# Patient Record
Sex: FEMALE | Race: WHITE
[De-identification: names, ages, dates, MRNs, and addresses within clinical notes are randomized per-mention and may not be internally consistent; named-entity substitution may affect disease eponyms.]

---

## 2017-08-22 ENCOUNTER — HOSPITAL ENCOUNTER (OUTPATIENT)
Dept: HOSPITAL 14 - H.ENDO | Age: 57
Discharge: HOME | End: 2017-08-22
Attending: INTERNAL MEDICINE
Payer: SELF-PAY

## 2017-08-22 VITALS — HEART RATE: 65 BPM | RESPIRATION RATE: 12 BRPM

## 2017-08-22 VITALS — DIASTOLIC BLOOD PRESSURE: 67 MMHG | TEMPERATURE: 97.5 F | SYSTOLIC BLOOD PRESSURE: 108 MMHG

## 2017-08-22 VITALS — OXYGEN SATURATION: 100 %

## 2017-08-22 DIAGNOSIS — K64.1: ICD-10-CM

## 2017-08-22 DIAGNOSIS — Z09: ICD-10-CM

## 2017-08-22 DIAGNOSIS — E03.9: ICD-10-CM

## 2017-08-22 DIAGNOSIS — K57.92: Primary | ICD-10-CM

## 2017-08-22 PROCEDURE — 45378 DIAGNOSTIC COLONOSCOPY: CPT

## 2017-09-25 ENCOUNTER — HOSPITAL ENCOUNTER (INPATIENT)
Dept: HOSPITAL 31 - C.9S | Age: 57
LOS: 4 days | Discharge: HOME | DRG: 149 | End: 2017-09-29
Attending: SURGERY | Admitting: SURGERY
Payer: COMMERCIAL

## 2017-09-25 VITALS — BODY MASS INDEX: 31.5 KG/M2

## 2017-09-25 DIAGNOSIS — S36.439A: ICD-10-CM

## 2017-09-25 DIAGNOSIS — K43.5: Primary | ICD-10-CM
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
## 2017-09-25 DIAGNOSIS — K57.30: ICD-10-CM

## 2017-09-25 DIAGNOSIS — K66.0: ICD-10-CM

## 2017-09-25 PROCEDURE — 0DNE0ZZ RELEASE LARGE INTESTINE, OPEN APPROACH: ICD-10-PCS | Performed by: SURGERY

## 2017-09-25 PROCEDURE — 0HB7XZZ EXCISION OF ABDOMEN SKIN, EXTERNAL APPROACH: ICD-10-PCS | Performed by: SURGERY

## 2017-09-25 PROCEDURE — 0WHF03Z INSERTION OF INFUSION DEVICE INTO ABDOMINAL WALL, OPEN APPROACH: ICD-10-PCS | Performed by: SURGERY

## 2017-09-25 PROCEDURE — 0DTJ0ZZ RESECTION OF APPENDIX, OPEN APPROACH: ICD-10-PCS | Performed by: SURGERY

## 2017-09-25 PROCEDURE — 0WQF0ZZ REPAIR ABDOMINAL WALL, OPEN APPROACH: ICD-10-PCS | Performed by: SURGERY

## 2017-09-25 PROCEDURE — 0DQE0ZZ REPAIR LARGE INTESTINE, OPEN APPROACH: ICD-10-PCS | Performed by: SURGERY

## 2017-09-25 RX ADMIN — HYDROMORPHONE HYDROCHLORIDE PRN MG: 1 INJECTION, SOLUTION INTRAMUSCULAR; INTRAVENOUS; SUBCUTANEOUS at 14:24

## 2017-09-25 RX ADMIN — HYDROMORPHONE HYDROCHLORIDE PRN MG: 1 INJECTION, SOLUTION INTRAMUSCULAR; INTRAVENOUS; SUBCUTANEOUS at 14:12

## 2017-09-25 RX ADMIN — HYDROMORPHONE HYDROCHLORIDE PRN MG: 1 INJECTION, SOLUTION INTRAMUSCULAR; INTRAVENOUS; SUBCUTANEOUS at 16:06

## 2017-09-25 RX ADMIN — HYDROMORPHONE HYDROCHLORIDE PRN MG: 1 INJECTION, SOLUTION INTRAMUSCULAR; INTRAVENOUS; SUBCUTANEOUS at 14:36

## 2017-09-25 RX ADMIN — TAZOBACTAM SODIUM AND PIPERACILLIN SODIUM SCH MLS/HR: 375; 3 INJECTION, SOLUTION INTRAVENOUS at 23:52

## 2017-09-25 NOTE — PCM.SURG1
Surgeon's Initial Post Op Note





- Surgeon's Notes


Surgeon: Dr German


Assistant: Kori Jefferson PGY3


Type of Anesthesia: General Endo


Pre-Operative Diagnosis: diverticulosis


Operative Findings: see report


Post-Operative Diagnosis: as above


Operation Performed: Colostomy reversal.  lysis of adhesions.  incidental 

appendectomy.  On-Q placement


Specimen/Specimens Removed: ostomy.  colon.  hernia sac.  appendix


Estimated Blood Loss: EBL {In ML}: 100


Blood Products Given: N/A


Drains Used: Jatin


Post-Op Condition: Good


Date of Surgery/Procedure: 09/25/17


Time of Surgery/Procedure: 13:57

## 2017-09-26 LAB
ALBUMIN/GLOB SERPL: 1.2 {RATIO} (ref 1–2.1)
ALP SERPL-CCNC: 36 U/L (ref 38–126)
ALT SERPL-CCNC: 27 U/L (ref 9–52)
AST SERPL-CCNC: 29 U/L (ref 14–36)
BASOPHILS # BLD AUTO: 0.1 K/UL (ref 0–0.2)
BASOPHILS NFR BLD: 0.7 % (ref 0–2)
BILIRUB SERPL-MCNC: 0.5 MG/DL (ref 0.2–1.3)
BUN SERPL-MCNC: 6 MG/DL (ref 7–17)
CALCIUM SERPL-MCNC: 7.9 MG/DL (ref 8.6–10.4)
CHLORIDE SERPL-SCNC: 105 MMOL/L (ref 98–107)
CO2 SERPL-SCNC: 23 MMOL/L (ref 22–30)
EOSINOPHIL # BLD AUTO: 0 K/UL (ref 0–0.7)
EOSINOPHIL NFR BLD: 0.2 % (ref 0–4)
EOSINOPHIL NFR BLD: 1 % (ref 0–4)
ERYTHROCYTE [DISTWIDTH] IN BLOOD BY AUTOMATED COUNT: 14.4 % (ref 11.5–14.5)
GLOBULIN SER-MCNC: 2.4 GM/DL (ref 2.2–3.9)
GLUCOSE SERPL-MCNC: 109 MG/DL (ref 65–105)
HCT VFR BLD CALC: 33.3 % (ref 34–47)
LYMPHOCYTES # BLD AUTO: 1.7 K/UL (ref 1–4.3)
LYMPHOCYTES NFR BLD AUTO: 9 % (ref 20–40)
MCH RBC QN AUTO: 28.6 PG (ref 27–31)
MCHC RBC AUTO-ENTMCNC: 33.7 G/DL (ref 33–37)
MCV RBC AUTO: 84.9 FL (ref 81–99)
MONOCYTES # BLD: 1.9 K/UL (ref 0–0.8)
MONOCYTES NFR BLD: 10.4 % (ref 0–10)
NEUTROPHILS NFR BLD AUTO: 76 % (ref 50–75)
NRBC BLD AUTO-RTO: 0 % (ref 0–2)
PLATELET # BLD: 314 K/UL (ref 130–400)
PMV BLD AUTO: 9.6 FL (ref 7.2–11.7)
POTASSIUM SERPL-SCNC: 3.6 MMOL/L (ref 3.6–5.2)
PROT SERPL-MCNC: 5.2 G/DL (ref 6.3–8.3)
SODIUM SERPL-SCNC: 138 MMOL/L (ref 132–148)
TOTAL CELLS COUNTED BLD: 100
WBC # BLD AUTO: 18.5 K/UL (ref 4.8–10.8)

## 2017-09-26 RX ADMIN — TAZOBACTAM SODIUM AND PIPERACILLIN SODIUM SCH MLS/HR: 375; 3 INJECTION, SOLUTION INTRAVENOUS at 16:03

## 2017-09-26 RX ADMIN — TAZOBACTAM SODIUM AND PIPERACILLIN SODIUM SCH MLS/HR: 375; 3 INJECTION, SOLUTION INTRAVENOUS at 12:01

## 2017-09-26 RX ADMIN — ENOXAPARIN SODIUM SCH MG: 30 INJECTION SUBCUTANEOUS at 10:11

## 2017-09-26 RX ADMIN — TAZOBACTAM SODIUM AND PIPERACILLIN SODIUM SCH MLS/HR: 375; 3 INJECTION, SOLUTION INTRAVENOUS at 22:01

## 2017-09-26 RX ADMIN — TAZOBACTAM SODIUM AND PIPERACILLIN SODIUM SCH MLS/HR: 375; 3 INJECTION, SOLUTION INTRAVENOUS at 05:28

## 2017-09-26 RX ADMIN — WATER SCH MLS/HR: 1 INJECTION INTRAMUSCULAR; INTRAVENOUS; SUBCUTANEOUS at 10:12

## 2017-09-26 RX ADMIN — WATER SCH MLS/HR: 1 INJECTION INTRAMUSCULAR; INTRAVENOUS; SUBCUTANEOUS at 02:08

## 2017-09-26 NOTE — CP.PCM.PN
<KoriJoseph CONOR - Last Filed: 09/26/17 09:05>





Subjective





- Date & Time of Evaluation


Date of Evaluation: 09/26/17


Time of Evaluation: 09:03





- Subjective


Subjective: 





Gen Sx:  Dr German





Pt S&E.  POD#1 s/p colostomy reversal.  Pt is doing very well.  Denies pain at 

this time.  Denies N/V, F/C.  Not yet passing flatus.  Making adequate urine 

output.  Using incentive spirometer.  OnQ in place and functioning.





NGT - 20cc


Jatin - 50cc





Objective





- Vital Signs/Intake and Output


Vital Signs (last 24 hours): 


 











Temp Pulse Resp BP Pulse Ox


 


 99.5 F   103 H  20   121/76   96 


 


 09/26/17 07:00  09/26/17 07:00  09/26/17 07:00  09/26/17 07:00  09/26/17 07:00








Intake and Output: 


 











 09/26/17 09/26/17





 06:59 18:59


 


Intake Total 1500 


 


Output Total 820 


 


Balance 680 














- Medications


Medications: 


 Current Medications





Enoxaparin Sodium (Lovenox)  30 mg SC DAILY [facility]


Hydromorphone/Sodium Chloride (Dilaudid Pca)  4.8 mg IV Q4H PRN; Protocol


   PRN Reason: Pain, moderate (4-7)


   Last Admin: 09/25/17 15:30 Dose:  4.8 mg


BUPIVACAINE 0.125%/0.9% NACL (Bupivacaine-Ns 0.125% On-Q )  600 mls @ 4 mls/

hr IJ ONCE ONE


   Stop: 10/01/17 14:59


Sodium Chloride (Sodium Chloride 0.9%)  1,000 mls @ 125 mls/hr IV .Q8H [facility]


   Last Admin: 09/26/17 06:00 Dose:  Not Given


Metronidazole (Flagyl)  500 mg in 100 mls @ 100 mls/hr IVPB Q8H [facility]


   Stop: 09/26/17 10:59


   Last Admin: 09/26/17 02:08 Dose:  100 mls/hr


Influenza Virus Vaccine (Afluria)  45 mcg IM .ONCE ONE


   Stop: 09/26/17 19:53


Ketorolac Tromethamine (Toradol)  30 mg IV Q6 PRN


   PRN Reason: Pain, moderate (4-7)


Levothyroxine Sodium (Synthroid)  100 mcg PO DAILY@0630 [facility]


   Last Admin: 09/26/17 06:01 Dose:  Not Given


Ondansetron HCl (Zofran Inj)  4 mg IVP Q6 PRN


   PRN Reason: Nausea/Vomiting


   Last Admin: 09/26/17 04:30 Dose:  4 mg











- Labs


Labs: 


 





 09/26/17 07:09 





 09/26/17 07:09 











- Constitutional


Appears: Non-toxic, No Acute Distress





- ENT Exam


ENT Exam: Mucous Membranes Moist





- Respiratory Exam


Respiratory Exam: absent: Accessory Muscle Use, Respiratory Distress





- Cardiovascular Exam


Cardiovascular Exam: REGULAR RHYTHM





- GI/Abdominal Exam


GI & Abdominal Exam: Soft, Tenderness (post-op and appropriate).  absent: 

Distended, Firm


Additional comments: 





dressing c/d/i





- Neurological Exam


Neurological Exam: Alert, Awake, Oriented x3





- Psychiatric Exam


Psychiatric exam: Normal Affect, Normal Mood





- Skin


Skin Exam: Normal Color, Warm





Assessment and Plan





- Assessment and Plan (Free Text)


Assessment: 





56F POD#1 s/p colostomy reversal


Plan: 





d/c cardenas


cont IV fluids


cont NGT to LIS


cont current pain regimen


OOB and ambulate - will order PT


will d/w Dr German continuation of abx





Joseph Sheikh, PGY3





<Byron German - Last Filed: 09/27/17 11:59>





Objective





- Vital Signs/Intake and Output


Vital Signs (last 24 hours): 


 











Temp Pulse Resp BP Pulse Ox


 


 98.7 F   98 H  18   135/85   94 L


 


 09/27/17 07:05  09/27/17 07:05  09/27/17 07:05  09/27/17 07:05  09/27/17 07:05








Intake and Output: 


 











 09/27/17 09/27/17





 06:59 18:59


 


Intake Total 2000 


 


Output Total 490 


 


Balance 1510 














- Medications


Medications: 


 Current Medications





Acetaminophen (Tylenol 325mg Tab)  975 mg PO Q6 PRN


   PRN Reason: Fever >100.4 F


Enoxaparin Sodium (Lovenox)  30 mg SC DAILY [facility]


   Last Admin: 09/26/17 10:11 Dose:  30 mg


Hydromorphone HCl (Dilaudid)  1 mg IVP Q3 PRN


   PRN Reason: Pain, moderate (4-7)


Hydromorphone HCl (Dilaudid)  0.5 mg IVP Q4H PRN


   PRN Reason: breakthrough pain


Sodium Chloride (Sodium Chloride 0.9%)  1,000 mls @ 125 mls/hr IV .Q8H [facility]


   Last Admin: 09/27/17 06:13 Dose:  125 mls/hr


Piperacillin Sod/Tazobactam Sod (Zosyn 3.375 Gm Iv Premix)  3.375 gm in 50 mls 

@ 100 mls/hr IVPB Q6H [facility]


   Last Admin: 09/27/17 11:13 Dose:  100 mls/hr


BUPIVACAINE 0.125%/0.9% NACL (Bupivacaine-Ns 0.125% On-Q )  600 mls @ 4 mls/

hr IJ ONCE ONE


   Stop: 10/03/17 17:59


Ketorolac Tromethamine (Toradol)  30 mg IV Q6 PRN


   PRN Reason: Pain, moderate (4-7)


   Last Admin: 09/26/17 13:55 Dose:  30 mg


Levothyroxine Sodium (Synthroid)  100 mcg PO DAILY@0630 [facility]


   Last Admin: 09/27/17 06:13 Dose:  Not Given


Ondansetron HCl (Zofran Inj)  4 mg IVP Q6 PRN


   PRN Reason: Nausea/Vomiting


   Last Admin: 09/26/17 12:07 Dose:  4 mg


Pantoprazole Sodium (Protonix Inj)  40 mg IVP DAILY [facility]











- Labs


Labs: 


 





 09/27/17 07:07 





 09/27/17 07:07 











Attending/Attestation





- Attestation


I have personally seen and examined this patient.: Yes


I have fully participated in the care of the patient.: Yes


I have reviewed all pertinent clinical information, including history, physical 

exam and plan: Yes


Notes (Text): 





09/27/17 11:58


Pt was seen and examined at bedside


Agree with above note and assessment


Pt is s/p colostomy reversal and partial rectal resection


DC cardenas, NG


DVT prophylaxis


C/w IV antibiotics for leucocytosis


repeat Labs in am


Plan d.w pt in detail

## 2017-09-26 NOTE — OP
PROCEDURE DATE:  09/25/2017



PREOPERATIVE DIAGNOSES:

1.  Colostomy status.

2.  Diverticulosis.

3.  Possible extensive postoperative adhesion due to previous exploratory

laparotomy.

4.  Colostomy site hernia.



POSTOPERATIVE DIAGNOSES:

1.  Colostomy status.

2.  Diverticulosis.

3.  Possible extensive postoperative adhesion due to previous exploratory

laparotomy.

4.  Colostomy site hernia.

5.  Thickened and long appendix.



PROCEDURES DONE

1.  Exploratory laparotomy.

2.  Extensive lysis of adhesions.

3.  Partial rectal resection.

4.  Colorectal anastomosis.

5.  Appendectomy.

6.  Repair of serosal tear of small bowel.

7.  On-Q pain catheter pump placement.

8.  Colostomy site hernia repair, primary closure, double layer.

9.  Revision of the old scar approximately 18 x 2 cm size.



SURGEON:  Dr. German.



ASSISTANT:  Joseph Forbes, PGY 3 resident and LILLY Rodas.



TYPE OF ANESTHESIA:  General endotracheal tube anesthesia.



ESTIMATED BLOOD LOSS:  Around 100 mL.



DRAIN:  A 19-Turkmen Jatin drain was placed.



COMPLICATIONS:  None.



INTRAOPERATIVE FINDINGS:  The patient had extensive postoperative adhesions

of the small bowel to anterior abdominal wall, small bowel to the colon,

small bowel to the pelvic organ, colon to the lateral abdominal wall.  The

patient also had a thickened and long appendix.  The patient also had

extensive serosal small bowel adhesions and the serosal tear of small bowel

was expected incidental occurrence.  The patient also had a colostomy site

hernia containing the small bowel that was reduced and hernial sac was also

resected and it was sent to the table for the pathology.



DESCRIPTION OF PROCEDURE:  On intraoperative steps, this is a 56-year-old

female who was diagnosed with colostomy status due to diverticulosis and

the patient had the previous Francisco's procedure 6 months ago and the

patient had a CT scan 5 days before suggestive of colostomy site hernia and

the patient was also consented for colostomy reversal possible colon

resection and the patient was brought to the OR, placed supine on the

operating table.  After induction of the anesthesia, abdomen was prepped

and draped in the usual sterile fashion.  The Dodge catheter and NG tube

was placed.  The patient was placed in [place] in modified lithotomy

position and the abdomen was prepped and draped in the usual sterile

fashion.  First elliptical incision was made surrounding the previous

incision and the previous incision was completely excised and it was sent

to the table for the pathology.  Now, the peritoneal cavity was entered and

the patient found to have extensive small bowel adhesion and first

extensive lysis of small bowel adhesion was done from the anterior

abdominal wall and there was a serosal tear that was repaired in double

layer of the small bowel and the serosal tear of the small bowel was

expected incidental occurrence due to extensive amount of adhesion of the

small bowel to all other surrounding structures and after that the pelvis

was mobilized and the colostomy site herniation of small bowel was also

reduced and elliptical incision was made surrounding the colostomy site and

colostomy was taken down and now the left colon was mobilized and that was

enough length up to left colon up to the pelvis reach and after that EEA

sizer was placed.  The patient found to have stricture at the previous

staple line of rectum and there was hard stool and rectum was mobilized in

the pelvis.  Both ureter was identified and TME dissection was done up to

the mid rectum and part of the rectum was resected and it was sent to the

table for the pathology and now the end-to-end anastomosis of the colon to

the rectum was done and anastomosis was not intention.  There was good

blood supply and there was two intact donuts after EEA use, and there was

no air leak present and after that more extensive lysis of adhesion was

done.  The patient found to have a thickened, long appendix and

appendectomy was done to prevent future appendicitis episode and after that

abdominal cavity was irrigated and On-Q pain catheter was placed.  Now the

colostomy site, hernial sac was resected and it was sent to the table to

the pathology and first muscle layer was approximated with #1 PDS loop

suture and after that another #1 Prolene suture was used anteriorly to

close the defect in full thickness continuous closure and hernial sac and

cavity was obliterated with a 2-0 Vicryl suture and afterwards the

abdominal cavity was irrigated one more time and abdomen was closed in a 2

layers.  The fascia with #1 low PDS, #1 Prolene interrupted sutures and

skin with staples and packing was placed at the colostomy site.  Dry

sterile dressing was applied.  The On-Q catheter was primed in the attached

to the balloon, pumped and the patient was extubated in the OR.  Sent to 

the postanesthesia care unit in stable condition.  There was no apparent

complication.  The patient tolerated the procedure well.





__________________________________________

Byron German MD



DD:  09/25/2017 17:35:24

DT:  09/26/2017 2:44:02

Job # [id number]

SEBASTIAN

## 2017-09-27 LAB
ALBUMIN/GLOB SERPL: 1.1 {RATIO} (ref 1–2.1)
ALP SERPL-CCNC: 46 U/L (ref 38–126)
ALT SERPL-CCNC: 27 U/L (ref 9–52)
AST SERPL-CCNC: 30 U/L (ref 14–36)
BASOPHILS # BLD AUTO: 0.1 K/UL (ref 0–0.2)
BASOPHILS NFR BLD: 0.4 % (ref 0–2)
BILIRUB SERPL-MCNC: 0.5 MG/DL (ref 0.2–1.3)
BUN SERPL-MCNC: 7 MG/DL (ref 7–17)
CALCIUM SERPL-MCNC: 7.7 MG/DL (ref 8.6–10.4)
CHLORIDE SERPL-SCNC: 104 MMOL/L (ref 98–107)
CO2 SERPL-SCNC: 21 MMOL/L (ref 22–30)
EOSINOPHIL # BLD AUTO: 0.4 K/UL (ref 0–0.7)
EOSINOPHIL NFR BLD: 2.2 % (ref 0–4)
ERYTHROCYTE [DISTWIDTH] IN BLOOD BY AUTOMATED COUNT: 14.4 % (ref 11.5–14.5)
GLOBULIN SER-MCNC: 2.6 GM/DL (ref 2.2–3.9)
GLUCOSE SERPL-MCNC: 77 MG/DL (ref 65–105)
HCT VFR BLD CALC: 30.8 % (ref 34–47)
LYMPHOCYTES # BLD AUTO: 2 K/UL (ref 1–4.3)
LYMPHOCYTES NFR BLD AUTO: 11.8 % (ref 20–40)
MCH RBC QN AUTO: 28.9 PG (ref 27–31)
MCHC RBC AUTO-ENTMCNC: 33.6 G/DL (ref 33–37)
MCV RBC AUTO: 85.9 FL (ref 81–99)
MONOCYTES # BLD: 1.5 K/UL (ref 0–0.8)
MONOCYTES NFR BLD: 8.9 % (ref 0–10)
NRBC BLD AUTO-RTO: 0 % (ref 0–2)
PLATELET # BLD: 246 K/UL (ref 130–400)
PMV BLD AUTO: 9.7 FL (ref 7.2–11.7)
POTASSIUM SERPL-SCNC: 3.3 MMOL/L (ref 3.6–5.2)
PROT SERPL-MCNC: 5.4 G/DL (ref 6.3–8.3)
SODIUM SERPL-SCNC: 141 MMOL/L (ref 132–148)
WBC # BLD AUTO: 16.9 K/UL (ref 4.8–10.8)

## 2017-09-27 RX ADMIN — TAZOBACTAM SODIUM AND PIPERACILLIN SODIUM SCH MLS/HR: 375; 3 INJECTION, SOLUTION INTRAVENOUS at 11:13

## 2017-09-27 RX ADMIN — TAZOBACTAM SODIUM AND PIPERACILLIN SODIUM SCH MLS/HR: 375; 3 INJECTION, SOLUTION INTRAVENOUS at 17:30

## 2017-09-27 RX ADMIN — TAZOBACTAM SODIUM AND PIPERACILLIN SODIUM SCH MLS/HR: 375; 3 INJECTION, SOLUTION INTRAVENOUS at 05:00

## 2017-09-27 RX ADMIN — ENOXAPARIN SODIUM SCH MG: 30 INJECTION SUBCUTANEOUS at 11:10

## 2017-09-27 RX ADMIN — HYDROMORPHONE HYDROCHLORIDE PRN MG: 1 INJECTION, SOLUTION INTRAMUSCULAR; INTRAVENOUS; SUBCUTANEOUS at 22:33

## 2017-09-27 RX ADMIN — TAZOBACTAM SODIUM AND PIPERACILLIN SODIUM SCH MLS/HR: 375; 3 INJECTION, SOLUTION INTRAVENOUS at 22:34

## 2017-09-27 NOTE — CP.PCM.PN
<Joseph Sheikh CONOR - Last Filed: 09/27/17 16:36>





Subjective





- Date & Time of Evaluation


Date of Evaluation: 09/27/17


Time of Evaluation: 07:15





- Subjective


Subjective: 





Gen Sx:  Dr German





Pt S&E.  THEO.  Pain well controlled.  OnQ replaced today.  Has been OOB and 

ambulating, using IS.  Denies N/V, F/C.  No flatus yet.





Objective





- Vital Signs/Intake and Output


Vital Signs (last 24 hours): 


 











Temp Pulse Resp BP Pulse Ox


 


 98.3 F   100 H  18   135/84   95 


 


 09/27/17 15:24  09/27/17 15:24  09/27/17 15:24  09/27/17 15:24  09/27/17 15:24








Intake and Output: 


 











 09/27/17 09/27/17





 06:59 18:59


 


Intake Total 2000 


 


Output Total 490 


 


Balance 1510 














- Medications


Medications: 


 Current Medications





Acetaminophen (Tylenol 325mg Tab)  975 mg PO Q6 PRN


   PRN Reason: Fever >100.4 F


Enoxaparin Sodium (Lovenox)  30 mg SC DAILY [facility]


   Last Admin: 09/26/17 10:11 Dose:  30 mg


Hydromorphone HCl (Dilaudid)  0.5 mg IVP Q4H PRN


   PRN Reason: Pain, moderate (4-7)


Hydromorphone HCl (Dilaudid)  1 mg IVP Q3 PRN


   PRN Reason: Pain, severe (8-10)


Sodium Chloride (Sodium Chloride 0.9%)  1,000 mls @ 125 mls/hr IV .Q8H [facility]


   Last Admin: 09/27/17 06:13 Dose:  125 mls/hr


Piperacillin Sod/Tazobactam Sod (Zosyn 3.375 Gm Iv Premix)  3.375 gm in 50 mls 

@ 100 mls/hr IVPB Q6H [facility]


   Last Admin: 09/27/17 11:13 Dose:  100 mls/hr


BUPIVACAINE 0.125%/0.9% NACL (Bupivacaine-Ns 0.125% On-Q )  600 mls @ 4 mls/

hr IJ ONCE ONE


   Stop: 10/03/17 17:59


   Last Admin: 09/27/17 13:08 Dose:  Not Given


Ketorolac Tromethamine (Toradol)  30 mg IV Q6 PRN


   PRN Reason: Pain, Mild (1-3)


Levothyroxine Sodium (Synthroid)  100 mcg PO DAILY@0630 [facility]


   Last Admin: 09/27/17 06:13 Dose:  Not Given


Ondansetron HCl (Zofran Inj)  4 mg IVP Q6 PRN


   PRN Reason: Nausea/Vomiting


   Last Admin: 09/26/17 12:07 Dose:  4 mg


Pantoprazole Sodium (Protonix Inj)  40 mg IVP DAILY [facility]











- Labs


Labs: 


 





 09/27/17 07:07 





 09/27/17 07:07 











- Constitutional


Appears: Non-toxic, No Acute Distress





- ENT Exam


ENT Exam: Mucous Membranes Moist





- Respiratory Exam


Respiratory Exam: absent: Accessory Muscle Use, Respiratory Distress





- Cardiovascular Exam


Cardiovascular Exam: REGULAR RHYTHM





- GI/Abdominal Exam


GI & Abdominal Exam: Soft, Tenderness (post-op and appropriate).  absent: 

Distended, Firm


Additional comments: 





incision c/d/i





- Extremities Exam


Extremities Exam: absent: Pedal Edema





- Neurological Exam


Neurological Exam: Alert, Awake, Oriented x3





- Psychiatric Exam


Psychiatric exam: Normal Affect, Normal Mood





Assessment and Plan





- Assessment and Plan (Free Text)


Assessment: 





56F POD#2 s/p colostomy reversal


Plan: 





OnQ replaced


D/C PCA


cont PT and IS use


awaiting bowel function


cont abx





d/w Dr Maxi Sheikh, PGY3





<Byron German B - Last Filed: 09/27/17 20:31>





Objective





- Vital Signs/Intake and Output


Vital Signs (last 24 hours): 


 











Temp Pulse Resp BP Pulse Ox


 


 98.3 F   100 H  18   135/84   95 


 


 09/27/17 15:24  09/27/17 15:24  09/27/17 15:24  09/27/17 15:24  09/27/17 15:24








Intake and Output: 


 











 09/27/17 09/28/17





 18:59 06:59


 


Intake Total 1040 


 


Output Total 80 


 


Balance 960 














- Medications


Medications: 


 Current Medications





Acetaminophen (Tylenol 325mg Tab)  975 mg PO Q6 PRN


   PRN Reason: Fever >100.4 F


Enoxaparin Sodium (Lovenox)  30 mg SC DAILY [facility]


   Last Admin: 09/26/17 10:11 Dose:  30 mg


Hydromorphone HCl (Dilaudid)  0.5 mg IVP Q4H PRN


   PRN Reason: Pain, moderate (4-7)


Sodium Chloride (Sodium Chloride 0.9%)  1,000 mls @ 125 mls/hr IV .Q8H [facility]


   Last Admin: 09/27/17 06:13 Dose:  125 mls/hr


Piperacillin Sod/Tazobactam Sod (Zosyn 3.375 Gm Iv Premix)  3.375 gm in 50 mls 

@ 100 mls/hr IVPB Q6H [facility]


   Last Admin: 09/27/17 17:30 Dose:  100 mls/hr


BUPIVACAINE 0.125%/0.9% NACL (Bupivacaine-Ns 0.125% On-Q )  600 mls @ 4 mls/

hr IJ ONCE ONE


   Stop: 10/03/17 17:59


   Last Admin: 09/27/17 13:08 Dose:  Not Given


Ketorolac Tromethamine (Toradol)  30 mg IV Q6 PRN


   PRN Reason: Pain, Mild (1-3)


Levothyroxine Sodium (Synthroid)  100 mcg PO DAILY@0630 [facility]


   Last Admin: 09/27/17 06:13 Dose:  Not Given


Ondansetron HCl (Zofran Inj)  4 mg IVP Q6 PRN


   PRN Reason: Nausea/Vomiting


   Last Admin: 09/26/17 12:07 Dose:  4 mg


Pantoprazole Sodium (Protonix Inj)  40 mg IVP DAILY [facility]











- Labs


Labs: 


 





 09/27/17 07:07 





 09/27/17 07:07 











Attending/Attestation





- Attestation


I have personally seen and examined this patient.: Yes


I have fully participated in the care of the patient.: Yes


I have reviewed all pertinent clinical information, including history, physical 

exam and plan: Yes


Notes (Text): 





09/27/17 20:30


Pt was seen and examined at bedside


Agree with above note and assessment


OOB to walk


c/w IV antibiotics due to leucocytosis


packing change tomorrow


Continue with On Q


Plan d.w pt in detail.

## 2017-09-28 VITALS — RESPIRATION RATE: 20 BRPM

## 2017-09-28 LAB
ALBUMIN/GLOB SERPL: 1 {RATIO} (ref 1–2.1)
ALP SERPL-CCNC: 43 U/L (ref 38–126)
ALT SERPL-CCNC: 28 U/L (ref 9–52)
AST SERPL-CCNC: 24 U/L (ref 14–36)
BASOPHILS # BLD AUTO: 0 K/UL (ref 0–0.2)
BASOPHILS NFR BLD: 0.2 % (ref 0–2)
BILIRUB SERPL-MCNC: 0.5 MG/DL (ref 0.2–1.3)
BUN SERPL-MCNC: 6 MG/DL (ref 7–17)
CALCIUM SERPL-MCNC: 7.7 MG/DL (ref 8.6–10.4)
CHLORIDE SERPL-SCNC: 104 MMOL/L (ref 98–107)
CO2 SERPL-SCNC: 18 MMOL/L (ref 22–30)
EOSINOPHIL # BLD AUTO: 0.4 K/UL (ref 0–0.7)
EOSINOPHIL NFR BLD: 2.8 % (ref 0–4)
ERYTHROCYTE [DISTWIDTH] IN BLOOD BY AUTOMATED COUNT: 14 % (ref 11.5–14.5)
GLOBULIN SER-MCNC: 2.7 GM/DL (ref 2.2–3.9)
GLUCOSE SERPL-MCNC: 63 MG/DL (ref 65–105)
HCT VFR BLD CALC: 27.6 % (ref 34–47)
LYMPHOCYTES # BLD AUTO: 1.6 K/UL (ref 1–4.3)
LYMPHOCYTES NFR BLD AUTO: 11.8 % (ref 20–40)
MCH RBC QN AUTO: 29.1 PG (ref 27–31)
MCHC RBC AUTO-ENTMCNC: 33.9 G/DL (ref 33–37)
MCV RBC AUTO: 85.7 FL (ref 81–99)
MONOCYTES # BLD: 1 K/UL (ref 0–0.8)
MONOCYTES NFR BLD: 7 % (ref 0–10)
NRBC BLD AUTO-RTO: 0 % (ref 0–2)
PLATELET # BLD: 245 K/UL (ref 130–400)
PMV BLD AUTO: 9.6 FL (ref 7.2–11.7)
POTASSIUM SERPL-SCNC: 3.4 MMOL/L (ref 3.6–5.2)
PROT SERPL-MCNC: 5.5 G/DL (ref 6.3–8.3)
SODIUM SERPL-SCNC: 140 MMOL/L (ref 132–148)
WBC # BLD AUTO: 13.9 K/UL (ref 4.8–10.8)

## 2017-09-28 RX ADMIN — TAZOBACTAM SODIUM AND PIPERACILLIN SODIUM SCH MLS/HR: 375; 3 INJECTION, SOLUTION INTRAVENOUS at 05:22

## 2017-09-28 RX ADMIN — TAZOBACTAM SODIUM AND PIPERACILLIN SODIUM SCH MLS/HR: 375; 3 INJECTION, SOLUTION INTRAVENOUS at 22:45

## 2017-09-28 RX ADMIN — HYDROMORPHONE HYDROCHLORIDE PRN MG: 1 INJECTION, SOLUTION INTRAMUSCULAR; INTRAVENOUS; SUBCUTANEOUS at 05:17

## 2017-09-28 RX ADMIN — TAZOBACTAM SODIUM AND PIPERACILLIN SODIUM SCH MLS/HR: 375; 3 INJECTION, SOLUTION INTRAVENOUS at 12:46

## 2017-09-28 RX ADMIN — ENOXAPARIN SODIUM SCH MG: 30 INJECTION SUBCUTANEOUS at 10:17

## 2017-09-28 RX ADMIN — TAZOBACTAM SODIUM AND PIPERACILLIN SODIUM SCH MLS/HR: 375; 3 INJECTION, SOLUTION INTRAVENOUS at 16:10

## 2017-09-28 RX ADMIN — HYDROMORPHONE HYDROCHLORIDE PRN MG: 1 INJECTION, SOLUTION INTRAMUSCULAR; INTRAVENOUS; SUBCUTANEOUS at 11:33

## 2017-09-28 RX ADMIN — HYDROMORPHONE HYDROCHLORIDE PRN MG: 1 INJECTION, SOLUTION INTRAMUSCULAR; INTRAVENOUS; SUBCUTANEOUS at 18:55

## 2017-09-28 NOTE — CP.PCM.PN
<Abigail Almeida - Last Filed: 09/28/17 11:36>





Subjective





- Date & Time of Evaluation


Date of Evaluation: 09/28/17


Time of Evaluation: 07:00





- Subjective


Subjective: 


Gen Sx Note for Dr. German





Pt S&E ad in no acute distress. Pain well controlled. Has been OOB and 

ambulating, using IS.  Denies N/V, F/C.  No flatus or BM yet. 











Objective





- Vital Signs/Intake and Output


Vital Signs (last 24 hours): 


 











Temp Pulse Resp BP Pulse Ox


 


 98 F   91 H  20   127/75   95 


 


 09/28/17 04:40  09/28/17 04:40  09/28/17 04:40  09/28/17 04:40  09/28/17 04:00








Intake and Output: 


 











 09/28/17 09/28/17





 06:59 18:59


 


Intake Total 1675 


 


Output Total 40 


 


Balance 1635 














- Medications


Medications: 


 Current Medications





Acetaminophen (Tylenol 325mg Tab)  975 mg PO Q6 PRN


   PRN Reason: Fever >100.4 F


Enoxaparin Sodium (Lovenox)  30 mg SC DAILY [facility]


   Last Admin: 09/26/17 10:11 Dose:  30 mg


Hydromorphone HCl (Dilaudid)  0.5 mg IVP Q4H PRN


   PRN Reason: Pain, moderate (4-7)


   Last Admin: 09/28/17 05:17 Dose:  0.5 mg


Sodium Chloride (Sodium Chloride 0.9%)  1,000 mls @ 125 mls/hr IV .Q8H [facility]


   Last Admin: 09/28/17 05:24 Dose:  125 mls/hr


Piperacillin Sod/Tazobactam Sod (Zosyn 3.375 Gm Iv Premix)  3.375 gm in 50 mls 

@ 100 mls/hr IVPB Q6H [facility]


   Last Admin: 09/28/17 05:22 Dose:  100 mls/hr


BUPIVACAINE 0.125%/0.9% NACL (Bupivacaine-Ns 0.125% On-Q )  600 mls @ 4 mls/

hr IJ ONCE ONE


   Stop: 10/03/17 17:59


   Last Admin: 09/27/17 13:08 Dose:  Not Given


Ketorolac Tromethamine (Toradol)  30 mg IV Q6 PRN


   PRN Reason: Pain, Mild (1-3)


Levothyroxine Sodium (Synthroid)  100 mcg PO DAILY@0630 [facility]


   Last Admin: 09/28/17 05:35 Dose:  Not Given


Ondansetron HCl (Zofran Inj)  4 mg IVP Q6 PRN


   PRN Reason: Nausea/Vomiting


   Last Admin: 09/28/17 05:21 Dose:  4 mg


Pantoprazole Sodium (Protonix Inj)  40 mg IVP DAILY [facility]











- Labs


Labs: 


 





 09/28/17 07:26 





 09/27/17 07:07 











- Constitutional


Appears: Non-toxic, No Acute Distress





- Head Exam


Head Exam: ATRAUMATIC, NORMAL INSPECTION, NORMOCEPHALIC





- Eye Exam


Eye Exam: EOMI, Normal appearance





- ENT Exam


ENT Exam: Mucous Membranes Moist





- Respiratory Exam


Respiratory Exam: absent: Accessory Muscle Use, Respiratory Distress





- Cardiovascular Exam


Cardiovascular Exam: REGULAR RHYTHM





- GI/Abdominal Exam


GI & Abdominal Exam: Soft, Tenderness


Additional comments: 





mild tenderness around surgical site 





- Extremities Exam


Extremities Exam: Normal Inspection





- Neurological Exam


Neurological Exam: Alert, Awake, Oriented x3





- Psychiatric Exam


Psychiatric exam: Normal Affect, Normal Mood





- Skin


Skin Exam: Intact, Normal Color, Warm





Assessment and Plan





- Assessment and Plan (Free Text)


Assessment: 


56F POD#3 s/p colostomy reversal





Plan: 


cont PT and IS use


awaiting bowel function


cont abx





d/w Dr German











<Byron German - Last Filed: 10/01/17 21:03>





Objective





- Vital Signs/Intake and Output


Vital Signs (last 24 hours): 


 











Temp Pulse Resp BP Pulse Ox


 


 98.2 F   72   20   142/84   95 


 


 09/29/17 18:00  09/29/17 18:00  09/29/17 18:00  09/29/17 18:00  09/29/17 18:00











- Labs


Labs: 


 





 09/29/17 11:49 





 09/29/17 07:01 











Attending/Attestation





- Attestation


I have personally seen and examined this patient.: Yes


I have fully participated in the care of the patient.: Yes


I have reviewed all pertinent clinical information, including history, physical 

exam and plan: Yes


Notes (Text): 





10/01/17 21:03


Pt was seen and examined at bedside


Agree with above note and assessment


C.w IV antibiotics


Liquid diet

## 2017-09-29 VITALS — OXYGEN SATURATION: 95 %

## 2017-09-29 VITALS — TEMPERATURE: 98.2 F | SYSTOLIC BLOOD PRESSURE: 142 MMHG | HEART RATE: 72 BPM | DIASTOLIC BLOOD PRESSURE: 84 MMHG

## 2017-09-29 LAB
ALBUMIN/GLOB SERPL: 1.2 {RATIO} (ref 1–2.1)
ALP SERPL-CCNC: 40 U/L (ref 38–126)
ALT SERPL-CCNC: 25 U/L (ref 9–52)
AST SERPL-CCNC: 22 U/L (ref 14–36)
BASOPHILS # BLD AUTO: 0 K/UL (ref 0–0.2)
BASOPHILS NFR BLD: 0.3 % (ref 0–2)
BILIRUB SERPL-MCNC: 0.5 MG/DL (ref 0.2–1.3)
BUN SERPL-MCNC: 5 MG/DL (ref 7–17)
CALCIUM SERPL-MCNC: 8.3 MG/DL (ref 8.6–10.4)
CHLORIDE SERPL-SCNC: 106 MMOL/L (ref 98–107)
CO2 SERPL-SCNC: 13 MMOL/L (ref 22–30)
EOSINOPHIL # BLD AUTO: 0.5 K/UL (ref 0–0.7)
EOSINOPHIL NFR BLD: 5 % (ref 0–4)
ERYTHROCYTE [DISTWIDTH] IN BLOOD BY AUTOMATED COUNT: 14 % (ref 11.5–14.5)
GLOBULIN SER-MCNC: 2.6 GM/DL (ref 2.2–3.9)
GLUCOSE SERPL-MCNC: 59 MG/DL (ref 65–105)
HCT VFR BLD CALC: 31.8 % (ref 34–47)
LYMPHOCYTES # BLD AUTO: 1.8 K/UL (ref 1–4.3)
LYMPHOCYTES NFR BLD AUTO: 18 % (ref 20–40)
MCH RBC QN AUTO: 28.8 PG (ref 27–31)
MCHC RBC AUTO-ENTMCNC: 33.9 G/DL (ref 33–37)
MCV RBC AUTO: 84.9 FL (ref 81–99)
MONOCYTES # BLD: 0.7 K/UL (ref 0–0.8)
MONOCYTES NFR BLD: 7.3 % (ref 0–10)
NRBC BLD AUTO-RTO: 0 % (ref 0–2)
PLATELET # BLD: 327 K/UL (ref 130–400)
PMV BLD AUTO: 9.6 FL (ref 7.2–11.7)
POTASSIUM SERPL-SCNC: 3.6 MMOL/L (ref 3.6–5.2)
PROT SERPL-MCNC: 5.7 G/DL (ref 6.3–8.3)
SODIUM SERPL-SCNC: 136 MMOL/L (ref 132–148)
WBC # BLD AUTO: 10 K/UL (ref 4.8–10.8)

## 2017-09-29 RX ADMIN — HYDROMORPHONE HYDROCHLORIDE PRN MG: 1 INJECTION, SOLUTION INTRAMUSCULAR; INTRAVENOUS; SUBCUTANEOUS at 00:55

## 2017-09-29 RX ADMIN — TAZOBACTAM SODIUM AND PIPERACILLIN SODIUM SCH MLS/HR: 375; 3 INJECTION, SOLUTION INTRAVENOUS at 12:09

## 2017-09-29 RX ADMIN — TAZOBACTAM SODIUM AND PIPERACILLIN SODIUM SCH MLS/HR: 375; 3 INJECTION, SOLUTION INTRAVENOUS at 05:47

## 2017-09-29 RX ADMIN — ENOXAPARIN SODIUM SCH MG: 30 INJECTION SUBCUTANEOUS at 09:45

## 2017-09-29 NOTE — CP.PCM.PN
<Cristel Harp - Last Filed: 09/29/17 08:07>





Subjective





- Date & Time of Evaluation


Date of Evaluation: 09/29/17


Time of Evaluation: 08:07





- Subjective


Subjective: 





Surgery 





Pt s&e. NAEON. Pain controlled. Denies F/C/N/V/D/CP/SOB. Reports BM. 





Objective





- Vital Signs/Intake and Output


Vital Signs (last 24 hours): 


 











Temp Pulse Resp BP Pulse Ox


 


 98.1 F   81   20   138/83   97 


 


 09/28/17 23:25  09/28/17 23:25  09/28/17 23:25  09/28/17 23:25  09/28/17 23:25








Intake and Output: 


 











 09/29/17 09/29/17





 06:59 18:59


 


Intake Total 1810 


 


Output Total 435 


 


Balance 1375 














- Medications


Medications: 


 Current Medications





Acetaminophen (Tylenol 325mg Tab)  975 mg PO Q6 PRN


   PRN Reason: Fever >100.4 F


Enoxaparin Sodium (Lovenox)  30 mg SC DAILY [facility]


   Last Admin: 09/28/17 10:17 Dose:  30 mg


Hydromorphone HCl (Dilaudid)  0.5 mg IVP Q4H PRN


   PRN Reason: Pain, moderate (4-7)


   Last Admin: 09/29/17 00:55 Dose:  0.5 mg


Sodium Chloride (Sodium Chloride 0.9%)  1,000 mls @ 125 mls/hr IV .Q8H [facility]


   Last Admin: 09/29/17 05:48 Dose:  Not Given


Piperacillin Sod/Tazobactam Sod (Zosyn 3.375 Gm Iv Premix)  3.375 gm in 50 mls 

@ 100 mls/hr IVPB Q6H [facility]


   Last Admin: 09/29/17 05:47 Dose:  100 mls/hr


BUPIVACAINE 0.125%/0.9% NACL (Bupivacaine-Ns 0.125% On-Q )  600 mls @ 4 mls/

hr IJ ONCE ONE


   Stop: 10/03/17 17:59


   Last Admin: 09/27/17 13:08 Dose:  Not Given


Ketorolac Tromethamine (Toradol)  30 mg IV Q6 PRN


   PRN Reason: Pain, Mild (1-3)


   Last Admin: 09/29/17 07:01 Dose:  30 mg


Levothyroxine Sodium (Synthroid)  100 mcg PO DAILY@0630 [facility]


   Last Admin: 09/29/17 05:51 Dose:  100 mcg


Ondansetron HCl (Zofran Inj)  4 mg IVP Q6 PRN


   PRN Reason: Nausea/Vomiting


   Last Admin: 09/28/17 05:21 Dose:  4 mg


Pantoprazole Sodium (Protonix Inj)  40 mg IVP DAILY [facility]


   Last Admin: 09/28/17 10:17 Dose:  40 mg











- Labs


Labs: 


 





 09/28/17 07:26 





 09/29/17 07:01 











- Constitutional


Appears: No Acute Distress





- Head Exam


Head Exam: ATRAUMATIC, NORMAL INSPECTION, NORMOCEPHALIC





- Eye Exam


Eye Exam: EOMI, Normal appearance, PERRL


Pupil Exam: NORMAL ACCOMODATION, PERRL





- ENT Exam


ENT Exam: Mucous Membranes Moist, Normal Exam





- Neck Exam


Neck Exam: Full ROM, Normal Inspection.  absent: Lymphadenopathy





- Respiratory Exam


Respiratory Exam: Clear to Ausculation Bilateral, NORMAL BREATHING PATTERN





- Cardiovascular Exam


Cardiovascular Exam: REGULAR RHYTHM, +S1, +S2.  absent: Murmur





- GI/Abdominal Exam


GI & Abdominal Exam: Soft, Normal Bowel Sounds.  absent: Distended, Firm, 

Guarding, Tenderness


Additional comments: 





Incision C/D/I. 





-  Exam


 Exam: NORMAL INSPECTION





- Extremities Exam


Extremities Exam: Full ROM, Normal Capillary Refill, Normal Inspection.  absent

: Joint Swelling, Pedal Edema





- Back Exam


Back Exam: NORMAL INSPECTION





- Neurological Exam


Neurological Exam: Alert, Awake, CN II-XII Intact, Normal Gait, Oriented x3





- Psychiatric Exam


Psychiatric exam: Normal Affect, Normal Mood





- Skin


Skin Exam: Dry, Intact, Normal Color, Warm





Assessment and Plan





- Assessment and Plan (Free Text)


Assessment: 





56F POD#4 s/p colostomy reversal: improving 





Plan: 


cont PT and IS use


CLD


Pain control





WIll d/w Dr German





<Byron German - Last Filed: 10/01/17 21:04>





Objective





- Vital Signs/Intake and Output


Vital Signs (last 24 hours): 


 











Temp Pulse Resp BP Pulse Ox


 


 98.2 F   72   20   142/84   95 


 


 09/29/17 18:00  09/29/17 18:00  09/29/17 18:00  09/29/17 18:00  09/29/17 18:00











- Labs


Labs: 


 





 09/29/17 11:49 





 09/29/17 07:01 











Attending/Attestation





- Attestation


I have personally seen and examined this patient.: Yes


I have fully participated in the care of the patient.: Yes


I have reviewed all pertinent clinical information, including history, physical 

exam and plan: Yes


Notes (Text): 





10/01/17 21:03


Pt was seen and examined at bedside


Agree with above note and assessment


C.w IV antibiotics


Liquid diet


DC home 


f/u as out pt

## 2017-11-06 NOTE — [email or web address]
Provider





- Provider


Date of Admission: 


09/25/17 06:07





Attending physician: 


Byron German MD





Time Spent in preparation of Discharge (in minutes): 45





Hospital Course





- Lab Results


Lab Results: 


 Most Recent Lab Values











WBC  10.0 K/uL (4.8-10.8)   09/29/17  11:49    


 


RBC  3.75 Mil/uL (3.80-5.20)  L  09/29/17  11:49    


 


Hgb  10.8 g/dL (11.0-16.0)  L  09/29/17  11:49    


 


Hct  31.8 % (34.0-47.0)  L  09/29/17  11:49    


 


MCV  84.9 fL (81.0-99.0)   09/29/17  11:49    


 


MCH  28.8 pg (27.0-31.0)   09/29/17  11:49    


 


MCHC  33.9 g/dL (33.0-37.0)   09/29/17  11:49    


 


RDW  14.0 % (11.5-14.5)   09/29/17  11:49    


 


Plt Count  327 K/uL (130-400)   09/29/17  11:49    


 


MPV  9.6 fL (7.2-11.7)   09/29/17  11:49    


 


Neut % (Auto)  69.4 % (50.0-75.0)   09/29/17  11:49    


 


Lymph % (Auto)  18.0 % (20.0-40.0)  L  09/29/17  11:49    


 


Mono % (Auto)  7.3 % (0.0-10.0)   09/29/17  11:49    


 


Eos % (Auto)  5.0 % (0.0-4.0)  H  09/29/17  11:49    


 


Baso % (Auto)  0.3 % (0.0-2.0)   09/29/17  11:49    


 


Neut #  6.9 K/uL (1.8-7.0)   09/29/17  11:49    


 


Lymph #  1.8 K/uL (1.0-4.3)   09/29/17  11:49    


 


Mono #  0.7 K/uL (0.0-0.8)   09/29/17  11:49    


 


Eos #  0.5 K/uL (0.0-0.7)   09/29/17  11:49    


 


Baso #  0.0 K/uL (0.0-0.2)   09/29/17  11:49    


 


Neutrophils % (Manual)  76 % (50-75)  H  09/26/17  07:09    


 


Band Neutrophils %  3 % (0-2)  H  09/26/17  07:09    


 


Lymphocytes % (Manual)  6 % (20-40)  L  09/26/17  07:09    


 


Monocytes % (Manual)  14 % (0-10)  H  09/26/17  07:09    


 


Eosinophils % (Manual)  1 % (0-4)   09/26/17  07:09    


 


Platelet Estimate  Normal  (NORMAL)   09/26/17  07:09    


 


RBC Morphology  Normal   09/26/17  07:09    


 


Sodium  136 mmol/L (132-148)   09/29/17  07:01    


 


Potassium  3.6 mmol/L (3.6-5.2)   09/29/17  07:01    


 


Chloride  106 mmol/L ()   09/29/17  07:01    


 


Carbon Dioxide  13 mmol/L (22-30)  L  09/29/17  07:01    


 


Anion Gap  21  (10-20)  H  09/29/17  07:01    


 


BUN  5 mg/dL (7-17)  L  09/29/17  07:01    


 


Creatinine  0.6 MG/DL (0.7-1.2)  L  09/29/17  07:01    


 


Est GFR ( Amer)  > 60   09/29/17  07:01    


 


Est GFR (Non-Af Amer)  > 60   09/29/17  07:01    


 


Random Glucose  59 mg/dL ()  L  09/29/17  07:01    


 


Calcium  8.3 mg/dl (8.6-10.4)  L  09/29/17  07:01    


 


Total Bilirubin  0.5 mg/dL (0.2-1.3)   09/29/17  07:01    


 


AST  22 U/L (14-36)   09/29/17  07:01    


 


ALT  25 U/L (9-52)   09/29/17  07:01    


 


Alkaline Phosphatase  40 U/L ()   09/29/17  07:01    


 


Total Protein  5.7 g/dL (6.3-8.3)  L  09/29/17  07:01    


 


Albumin  3.0 g/dL (3.5-5.0)  L  09/29/17  07:01    


 


Globulin  2.6 gm/dL (2.2-3.9)   09/29/17  07:01    


 


Albumin/Globulin Ratio  1.2  (1.0-2.1)   09/29/17  07:01    


 


Blood Type  O POSITIVE   09/25/17  06:54    


 


Antibody Screen  Negative   09/25/17  06:54    














- Hospital Course


Hospital Course: 





56 f w pmh pmh of colostomy for diverticulitis came to [facility] for colostomy 

reversal. Pt tolerated it well. Pt had BM on POD4. Pain controlled. Tolerated 

diet. Leukocytosis trended down. Pt denies F/C/N/V/D/CP/SOB. Pt is cleared to 

go home. 





Discharge Exam





- Head Exam


Head Exam: ATRAUMATIC, NORMAL INSPECTION, NORMOCEPHALIC





- Eye Exam


Eye Exam: EOMI, Normal appearance





- Respiratory Exam


Respiratory Exam: UNREMARKABLE





- Cardiovascular Exam


Cardiovascular Exam: REGULAR RHYTHM





- GI/Abdominal Exam


GI & Abdominal Exam: Unremarkable





-  Exam


 Exam: NORMAL INSPECTION





- Back Exam


Back exam: NORMAL INSPECTION





- Psychiatric Exam


Psychiatric exam: Normal Affect, Normal Mood





- Skin


Skin Exam: Dry, Intact, Normal Color, Warm





Discharge Plan





- Discharge Medications


Prescriptions: 


Amoxicillin/Potassium Clav [Augmentin 500 mg-125 mg] 1 tab PO BIDPC #7 tab





- Follow Up Plan


Condition: GOOD


Disposition: HOME/ ROUTINE


Instructions:  Open Colostomy Reversal (DC)


Additional Instructions: 


follow up with Dr. German next week


No heavy lifting for  1 month


Ok to take shower in 2 days. 


REsume diet


Take antibiotic daily for 7 days


Take motrin for pain as needed.
Patient

## 2018-06-13 ENCOUNTER — HOSPITAL ENCOUNTER (EMERGENCY)
Dept: HOSPITAL 14 - H.ER | Age: 58
Discharge: HOME | End: 2018-06-13
Payer: SELF-PAY

## 2018-06-13 VITALS
TEMPERATURE: 97.8 F | SYSTOLIC BLOOD PRESSURE: 131 MMHG | DIASTOLIC BLOOD PRESSURE: 76 MMHG | RESPIRATION RATE: 78 BRPM | HEART RATE: 71 BPM

## 2018-06-13 VITALS — BODY MASS INDEX: 31.5 KG/M2

## 2018-06-13 VITALS — OXYGEN SATURATION: 98 %

## 2018-06-13 DIAGNOSIS — E03.9: ICD-10-CM

## 2018-06-13 DIAGNOSIS — N93.9: Primary | ICD-10-CM

## 2018-06-13 DIAGNOSIS — Z87.891: ICD-10-CM

## 2018-06-13 LAB
BASOPHILS # BLD AUTO: 0.1 K/UL (ref 0–0.2)
BASOPHILS NFR BLD: 0.6 % (ref 0–2)
BUN SERPL-MCNC: 17 MG/DL (ref 7–17)
CALCIUM SERPL-MCNC: 9.1 MG/DL (ref 8.4–10.2)
EOSINOPHIL # BLD AUTO: 0.4 K/UL (ref 0–0.7)
EOSINOPHIL NFR BLD: 4.1 % (ref 0–4)
ERYTHROCYTE [DISTWIDTH] IN BLOOD BY AUTOMATED COUNT: 13.5 % (ref 11.5–14.5)
GFR NON-AFRICAN AMERICAN: > 60
HGB BLD-MCNC: 12.6 G/DL (ref 12–16)
LYMPHOCYTES # BLD AUTO: 2.7 K/UL (ref 1–4.3)
LYMPHOCYTES NFR BLD AUTO: 26.6 % (ref 20–40)
MCH RBC QN AUTO: 30.1 PG (ref 27–31)
MCHC RBC AUTO-ENTMCNC: 34.4 G/DL (ref 33–37)
MCV RBC AUTO: 87.4 FL (ref 81–99)
MONOCYTES # BLD: 0.9 K/UL (ref 0–0.8)
MONOCYTES NFR BLD: 9.1 % (ref 0–10)
NEUTROPHILS # BLD: 6.1 K/UL (ref 1.8–7)
NEUTROPHILS NFR BLD AUTO: 59.6 % (ref 50–75)
NRBC BLD AUTO-RTO: 0.1 % (ref 0–0)
PLATELET # BLD: 272 K/UL (ref 130–400)
PMV BLD AUTO: 9.8 FL (ref 7.2–11.7)
RBC # BLD AUTO: 4.2 MIL/UL (ref 3.8–5.2)
WBC # BLD AUTO: 10.3 K/UL (ref 4.8–10.8)

## 2019-05-16 ENCOUNTER — HOSPITAL ENCOUNTER (EMERGENCY)
Dept: HOSPITAL 14 - H.ER | Age: 59
Discharge: HOME | End: 2019-05-16
Payer: SELF-PAY

## 2019-05-16 VITALS
OXYGEN SATURATION: 99 % | DIASTOLIC BLOOD PRESSURE: 90 MMHG | SYSTOLIC BLOOD PRESSURE: 135 MMHG | HEART RATE: 65 BPM | TEMPERATURE: 98 F

## 2019-05-16 VITALS — RESPIRATION RATE: 18 BRPM

## 2019-05-16 VITALS — BODY MASS INDEX: 31.5 KG/M2

## 2019-05-16 DIAGNOSIS — E03.9: ICD-10-CM

## 2019-05-16 DIAGNOSIS — N30.90: ICD-10-CM

## 2019-05-16 DIAGNOSIS — R10.9: Primary | ICD-10-CM

## 2019-05-16 DIAGNOSIS — N39.0: ICD-10-CM

## 2019-05-16 LAB
ALBUMIN SERPL-MCNC: 4.2 G/DL (ref 3.5–5)
ALBUMIN/GLOB SERPL: 1.4 {RATIO} (ref 1–2.1)
ALT SERPL-CCNC: 29 U/L (ref 9–52)
AST SERPL-CCNC: 24 U/L (ref 14–36)
BACTERIA #/AREA URNS HPF: (no result) /[HPF]
BASE EXCESS BLDV CALC-SCNC: 2.7 MMOL/L (ref 0–2)
BASOPHILS # BLD AUTO: 0.1 K/UL (ref 0–0.2)
BASOPHILS NFR BLD: 0.6 % (ref 0–2)
BILIRUB UR-MCNC: NEGATIVE MG/DL
BUN SERPL-MCNC: 12 MG/DL (ref 7–17)
CALCIUM SERPL-MCNC: 8.7 MG/DL (ref 8.4–10.2)
COLOR UR: COLORLESS
EOSINOPHIL # BLD AUTO: 0.1 K/UL (ref 0–0.7)
EOSINOPHIL NFR BLD: 1.3 % (ref 0–4)
ERYTHROCYTE [DISTWIDTH] IN BLOOD BY AUTOMATED COUNT: 13.7 % (ref 11.5–14.5)
GFR NON-AFRICAN AMERICAN: > 60
GLUCOSE UR STRIP-MCNC: NEGATIVE MG/DL
HGB BLD-MCNC: 12.5 G/DL (ref 12–16)
LEUKOCYTE ESTERASE UR-ACNC: (no result) LEU/UL
LYMPHOCYTES # BLD AUTO: 2.5 K/UL (ref 1–4.3)
LYMPHOCYTES NFR BLD AUTO: 22.6 % (ref 20–40)
MCH RBC QN AUTO: 28.7 PG (ref 27–31)
MCHC RBC AUTO-ENTMCNC: 32.8 G/DL (ref 33–37)
MCV RBC AUTO: 87.6 FL (ref 81–99)
MONOCYTES # BLD: 1 K/UL (ref 0–0.8)
MONOCYTES NFR BLD: 8.7 % (ref 0–10)
NEUTROPHILS # BLD: 7.3 K/UL (ref 1.8–7)
NEUTROPHILS NFR BLD AUTO: 66.8 % (ref 50–75)
NRBC BLD AUTO-RTO: 0.1 % (ref 0–0)
PCO2 BLDV: 50 MMHG (ref 40–60)
PH BLDV: 7.37 [PH] (ref 7.32–7.43)
PH UR STRIP: 7 [PH] (ref 5–8)
PLATELET # BLD: 319 K/UL (ref 130–400)
PMV BLD AUTO: 9.5 FL (ref 7.2–11.7)
PROT UR STRIP-MCNC: NEGATIVE MG/DL
RBC # BLD AUTO: 4.36 MIL/UL (ref 3.8–5.2)
RBC # UR STRIP: (no result) /UL
SP GR UR STRIP: < 1.005 (ref 1–1.03)
SQUAMOUS EPITHIAL: 1 /HPF (ref 0–5)
URINE CLARITY: CLEAR
UROBILINOGEN UR-MCNC: 0.2 MG/DL (ref 0.2–1)
VENOUS BLOOD FIO2: 21 %
VENOUS BLOOD GAS PO2: 25 MM/HG (ref 30–55)
WBC # BLD AUTO: 10.9 K/UL (ref 4.8–10.8)

## 2019-05-16 PROCEDURE — 84443 ASSAY THYROID STIM HORMONE: CPT

## 2019-05-16 PROCEDURE — 87086 URINE CULTURE/COLONY COUNT: CPT

## 2019-05-16 PROCEDURE — 80053 COMPREHEN METABOLIC PANEL: CPT

## 2019-05-16 PROCEDURE — 96361 HYDRATE IV INFUSION ADD-ON: CPT

## 2019-05-16 PROCEDURE — 82803 BLOOD GASES ANY COMBINATION: CPT

## 2019-05-16 PROCEDURE — 85025 COMPLETE CBC W/AUTO DIFF WBC: CPT

## 2019-05-16 PROCEDURE — 99284 EMERGENCY DEPT VISIT MOD MDM: CPT

## 2019-05-16 PROCEDURE — 96374 THER/PROPH/DIAG INJ IV PUSH: CPT

## 2019-05-16 PROCEDURE — 74177 CT ABD & PELVIS W/CONTRAST: CPT

## 2019-05-16 PROCEDURE — 87040 BLOOD CULTURE FOR BACTERIA: CPT

## 2019-05-16 PROCEDURE — 81003 URINALYSIS AUTO W/O SCOPE: CPT
